# Patient Record
Sex: MALE | Race: WHITE
[De-identification: names, ages, dates, MRNs, and addresses within clinical notes are randomized per-mention and may not be internally consistent; named-entity substitution may affect disease eponyms.]

---

## 2019-07-10 ENCOUNTER — HOSPITAL ENCOUNTER (OUTPATIENT)
Dept: HOSPITAL 93 - CIR.AMB | Age: 44
Discharge: HOME | End: 2019-07-10
Attending: SURGERY
Payer: COMMERCIAL

## 2019-07-10 DIAGNOSIS — M65.841: Primary | ICD-10-CM

## 2020-09-19 ENCOUNTER — HOSPITAL ENCOUNTER (INPATIENT)
Dept: HOSPITAL 93 - ER | Age: 45
LOS: 8 days | Discharge: HOME | DRG: 419 | End: 2020-09-27
Attending: COLON & RECTAL SURGERY | Admitting: COLON & RECTAL SURGERY
Payer: COMMERCIAL

## 2020-09-19 VITALS — WEIGHT: 180 LBS | HEIGHT: 68 IN | BODY MASS INDEX: 27.28 KG/M2

## 2020-09-19 DIAGNOSIS — K82.A1: ICD-10-CM

## 2020-09-19 DIAGNOSIS — K80.00: Primary | ICD-10-CM

## 2020-09-19 DIAGNOSIS — Z20.828: ICD-10-CM

## 2020-09-19 DIAGNOSIS — D64.89: ICD-10-CM

## 2020-09-19 NOTE — NUR
PACIENTE ALERTA Y ORIENTADO X3. MANEJADO POR MISS. [place]AGU[place]S QUIEN ORIENTA A
PACIENTE SOBRE TX Y PROCEDIMIENTO A REALIZAR Y REFIERE ENTENDER. REALIZA
MUESTRAS DE LABORATORIO BAJO MEDIDAS ASEPTICAS. CANALIZACION PATENTE Y DAVE DE
EDEMA Y ERITEMA. PENDIENTE SONOGRAMA ABDOMINAL. PENDIENTE ADMINISTRAR
MEDICAMENTOS LUEGO DE REALIZACION SONOGRAMA ABDOMINAL.

## 2020-09-21 PROCEDURE — 0FT44ZZ RESECTION OF GALLBLADDER, PERCUTANEOUS ENDOSCOPIC APPROACH: ICD-10-PCS | Performed by: COLON & RECTAL SURGERY

## 2020-10-21 ENCOUNTER — HOSPITAL ENCOUNTER (OUTPATIENT)
Dept: HOSPITAL 93 - NUCLEAR | Age: 45
Discharge: HOME | End: 2020-10-21
Attending: COLON & RECTAL SURGERY
Payer: COMMERCIAL

## 2020-10-21 DIAGNOSIS — K81.1: Primary | ICD-10-CM

## 2020-10-21 PROCEDURE — 78227 HEPATOBIL SYST IMAGE W/DRUG: CPT

## 2020-10-30 ENCOUNTER — HOSPITAL ENCOUNTER (INPATIENT)
Dept: HOSPITAL 93 - ER | Age: 45
LOS: 2 days | Discharge: HOME | DRG: 373 | End: 2020-11-01
Attending: COLON & RECTAL SURGERY | Admitting: COLON & RECTAL SURGERY
Payer: COMMERCIAL

## 2020-10-30 VITALS — WEIGHT: 177 LBS | HEIGHT: 68 IN | BODY MASS INDEX: 26.83 KG/M2

## 2020-10-30 DIAGNOSIS — Z20.828: ICD-10-CM

## 2020-10-30 DIAGNOSIS — K65.1: Primary | ICD-10-CM

## 2020-10-30 DIAGNOSIS — R33.9: ICD-10-CM

## 2020-10-30 DIAGNOSIS — K66.8: ICD-10-CM

## 2020-10-30 PROCEDURE — 0W9G30Z DRAINAGE OF PERITONEAL CAVITY WITH DRAINAGE DEVICE, PERCUTANEOUS APPROACH: ICD-10-PCS | Performed by: RADIOLOGY

## 2020-10-30 PROCEDURE — BW21ZZZ COMPUTERIZED TOMOGRAPHY (CT SCAN) OF ABDOMEN AND PELVIS: ICD-10-PCS | Performed by: COLON & RECTAL SURGERY
